# Patient Record
Sex: MALE | Race: WHITE | HISPANIC OR LATINO | Employment: OTHER | URBAN - METROPOLITAN AREA
[De-identification: names, ages, dates, MRNs, and addresses within clinical notes are randomized per-mention and may not be internally consistent; named-entity substitution may affect disease eponyms.]

---

## 2024-09-30 ENCOUNTER — APPOINTMENT (OUTPATIENT)
Dept: OPHTHALMOLOGY | Facility: CLINIC | Age: 79
End: 2024-09-30
Payer: MEDICARE

## 2024-09-30 DIAGNOSIS — H02.206 LAGOPHTHALMOS OF EYELIDS OF BOTH EYES, UNSPECIFIED EYELID, UNSPECIFIED LAGOPHTHALMOS TYPE: ICD-10-CM

## 2024-09-30 DIAGNOSIS — H02.59 FLOPPY EYELID SYNDROME: ICD-10-CM

## 2024-09-30 DIAGNOSIS — H02.105 ECTROPION OF BOTH LOWER EYELIDS, UNSPECIFIED ECTROPION TYPE: Primary | ICD-10-CM

## 2024-09-30 DIAGNOSIS — E11.319 DIABETIC RETINOPATHY OF BOTH EYES WITHOUT MACULAR EDEMA ASSOCIATED WITH TYPE 2 DIABETES MELLITUS, UNSPECIFIED RETINOPATHY SEVERITY: ICD-10-CM

## 2024-09-30 DIAGNOSIS — H25.813 COMBINED FORMS OF AGE-RELATED CATARACT OF BOTH EYES: ICD-10-CM

## 2024-09-30 DIAGNOSIS — H26.8 PSEUDOEXFOLIATION OF LENS CAPSULE: ICD-10-CM

## 2024-09-30 DIAGNOSIS — H02.102 ECTROPION OF BOTH LOWER EYELIDS, UNSPECIFIED ECTROPION TYPE: Primary | ICD-10-CM

## 2024-09-30 DIAGNOSIS — H02.203 LAGOPHTHALMOS OF EYELIDS OF BOTH EYES, UNSPECIFIED EYELID, UNSPECIFIED LAGOPHTHALMOS TYPE: ICD-10-CM

## 2024-09-30 PROCEDURE — 92136 OPHTHALMIC BIOMETRY: CPT | Performed by: OPHTHALMOLOGY

## 2024-09-30 PROCEDURE — 99203 OFFICE O/P NEW LOW 30 MIN: CPT | Performed by: OPHTHALMOLOGY

## 2024-09-30 PROCEDURE — 92134 CPTRZ OPH DX IMG PST SGM RTA: CPT | Performed by: OPHTHALMOLOGY

## 2024-09-30 PROCEDURE — 92136 OPHTHALMIC BIOMETRY: CPT | Mod: BILATERAL PROCEDURE | Performed by: OPHTHALMOLOGY

## 2024-09-30 PROCEDURE — 92025 CPTRIZED CORNEAL TOPOGRAPHY: CPT | Performed by: OPHTHALMOLOGY

## 2024-09-30 ASSESSMENT — REFRACTION_MANIFEST
OS_AXIS: 035
OS_SPHERE: +0.75
OD_SPHERE: +0.75
OD_CYLINDER: -2.75
OS_ADD: +2.50
METHOD_AUTOREFRACTION: 1
OS_CYLINDER: -1.75
METHOD_AUTOREFRACTION: 1
OS_SPHERE: +0.75
OD_AXIS: 135
OD_CYLINDER: -2.75
OD_ADD: +2.50
OD_SPHERE: +0.75
OS_CYLINDER: -1.75
OS_AXIS: 035
OD_AXIS: 135

## 2024-09-30 ASSESSMENT — VISUAL ACUITY
OS_PH_SC: 20/50-2
OS_SC: 20/70
OD_SC: 20/70
OD_PH_SC: 20/50
METHOD: SNELLEN - LINEAR

## 2024-09-30 ASSESSMENT — TONOMETRY
OD_IOP_MMHG: 12
IOP_METHOD: TONOPEN APPLANATION
OS_IOP_MMHG: 11

## 2024-09-30 ASSESSMENT — CONF VISUAL FIELD
OS_INFERIOR_TEMPORAL_RESTRICTION: 0
OD_SUPERIOR_TEMPORAL_RESTRICTION: 0
OD_NORMAL: 1
OS_NORMAL: 1
OD_INFERIOR_TEMPORAL_RESTRICTION: 0
OD_INFERIOR_NASAL_RESTRICTION: 0
OS_SUPERIOR_NASAL_RESTRICTION: 0
OS_SUPERIOR_TEMPORAL_RESTRICTION: 0
OD_SUPERIOR_NASAL_RESTRICTION: 0
OS_INFERIOR_NASAL_RESTRICTION: 0

## 2024-09-30 ASSESSMENT — CUP TO DISC RATIO
OS_RATIO: 0.3
OD_RATIO: 0.3

## 2024-10-02 NOTE — PROGRESS NOTES
Assessment/Plan   Diagnoses and all orders for this visit:  Ectropion of both lower eyelids, unspecified ectropion type  Combined forms of age-related cataract of both eyes  -     IOL Biometry - OU - Both Eyes  Diabetic retinopathy of both eyes without macular edema associated with type 2 diabetes mellitus, unspecified retinopathy severity (Multi)  -     OCT, Retina - OU - Both Eyes  OCT macula done today. High quality scans obtained. It shows normal macular contour with preserved foveal depression   Pseudoexfoliation of lens capsule  -     Corneal Topography - OU - Both Eyes  Lagophthalmos of eyelids of both eyes, unspecified eyelid, unspecified lagophthalmos type  -     OCT, Retina - OU - Both Eyes  Floppy eyelid syndrome      Patient has Visually significant cataract in both eyes, he wants to have the surgery later. He will come back in November with his sister (power of ) to schedule the surgery.    For now, he needs to lubricate the eye with Systane Ultra QID and Systane night time.    See in 1-2 months to schedule for surgery.

## 2024-11-11 ENCOUNTER — APPOINTMENT (OUTPATIENT)
Dept: OPHTHALMOLOGY | Facility: CLINIC | Age: 79
End: 2024-11-11
Payer: MEDICARE

## 2024-11-20 ENCOUNTER — APPOINTMENT (OUTPATIENT)
Dept: OPHTHALMOLOGY | Facility: CLINIC | Age: 79
End: 2024-11-20
Payer: MEDICARE

## 2024-12-24 ENCOUNTER — APPOINTMENT (OUTPATIENT)
Dept: OPHTHALMOLOGY | Facility: CLINIC | Age: 79
End: 2024-12-24
Payer: MEDICARE